# Patient Record
Sex: FEMALE | Race: WHITE | NOT HISPANIC OR LATINO | URBAN - METROPOLITAN AREA
[De-identification: names, ages, dates, MRNs, and addresses within clinical notes are randomized per-mention and may not be internally consistent; named-entity substitution may affect disease eponyms.]

---

## 2020-06-02 ENCOUNTER — TELEPHONE ENCOUNTER (OUTPATIENT)
Dept: URBAN - METROPOLITAN AREA CLINIC 92 | Facility: CLINIC | Age: 65
End: 2020-06-02

## 2021-02-19 ENCOUNTER — LAB OUTSIDE AN ENCOUNTER (OUTPATIENT)
Dept: URBAN - NONMETROPOLITAN AREA CLINIC 2 | Facility: CLINIC | Age: 66
End: 2021-02-19

## 2021-02-19 ENCOUNTER — OFFICE VISIT (OUTPATIENT)
Dept: URBAN - NONMETROPOLITAN AREA CLINIC 2 | Facility: CLINIC | Age: 66
End: 2021-02-19
Payer: MEDICARE

## 2021-02-19 DIAGNOSIS — Z80.0 FAMILY HISTORY OF COLON CANCER: ICD-10-CM

## 2021-02-19 DIAGNOSIS — R77.2 ELEVATED AFP: ICD-10-CM

## 2021-02-19 DIAGNOSIS — R11.0 NAUSEA: ICD-10-CM

## 2021-02-19 DIAGNOSIS — E78.00 ELEVATED CHOLESTEROL: ICD-10-CM

## 2021-02-19 DIAGNOSIS — K74.3 PRIMARY BILIARY CIRRHOSIS: ICD-10-CM

## 2021-02-19 DIAGNOSIS — K63.89 SMALL BOWEL POLYP: ICD-10-CM

## 2021-02-19 DIAGNOSIS — Z86.010 PERSONAL HISTORY OF COLONIC POLYPS: ICD-10-CM

## 2021-02-19 PROCEDURE — 3017F COLORECTAL CA SCREEN DOC REV: CPT | Performed by: INTERNAL MEDICINE

## 2021-02-19 PROCEDURE — G8417 CALC BMI ABV UP PARAM F/U: HCPCS | Performed by: INTERNAL MEDICINE

## 2021-02-19 PROCEDURE — 99214 OFFICE O/P EST MOD 30 MIN: CPT | Performed by: INTERNAL MEDICINE

## 2021-02-19 PROCEDURE — G8484 FLU IMMUNIZE NO ADMIN: HCPCS | Performed by: INTERNAL MEDICINE

## 2021-02-19 PROCEDURE — G9903 PT SCRN TBCO ID AS NON USER: HCPCS | Performed by: INTERNAL MEDICINE

## 2021-02-19 PROCEDURE — G8427 DOCREV CUR MEDS BY ELIG CLIN: HCPCS | Performed by: INTERNAL MEDICINE

## 2021-02-19 RX ORDER — URSODIOL 500 MG/1
TAKE 1 TABLET BY MOUTH TWICE DAILY TABLET ORAL
Qty: 180 | Refills: 3 | Status: ACTIVE | COMMUNITY
Start: 2020-05-20

## 2021-02-19 RX ORDER — PHENTERMINE HYDROCHLORIDE 30 MG/1
CAPSULE ORAL
Qty: 0 | Refills: 0 | Status: ACTIVE | COMMUNITY
Start: 1900-01-01

## 2021-02-19 NOTE — HPI-TODAY'S VISIT:
Patient returns for follow-up of primary biliary cirrhosis and an elevated alpha-fetoprotein.  She is on ursodiol daily.  Labs last year were normal other than an elevated alkaline phosphatase.  Review of labs that she brings with her dated 1/21/2021 showed normal LFTs other than an alkaline phosphatase of 203.  Her cholesterol remains elevated. She denies right upper quadrant pain jaundice or itching.  Her appetite is good and her weight is up slightly.  She has no signs or symptoms of decompensated liver disease. Her bowel movements are normal.  There is no evidence of bleeding.  There is been no change in her bowel habits.  She does have a history of adenomatous polyps.  Her next colonoscopy is due in 2023. She denies reflux symptoms.  She has no dysphagia.  She is on no new medications. She had Covid infection in September 2020.  She did not require hospitalization.

## 2021-02-20 LAB
AFP, SERUM, TUMOR MARKER: 17.7
HEMATOCRIT: 40.5
HEMOGLOBIN: 13.7
MCH: 31.3
MCHC: 33.8
MCV: 93
NRBC: (no result)
PLATELETS: 258
RBC: 4.38
RDW: 12.9
WBC: 4.2

## 2021-05-05 ENCOUNTER — ERX REFILL RESPONSE (OUTPATIENT)
Dept: URBAN - NONMETROPOLITAN AREA CLINIC 2 | Facility: CLINIC | Age: 66
End: 2021-05-05

## 2021-05-05 RX ORDER — URSODIOL 500 MG/1
TAKE 1 TABLET BY MOUTH TWICE DAILY TABLET ORAL
Qty: 60 | Refills: 0

## 2021-08-20 ENCOUNTER — OFFICE VISIT (OUTPATIENT)
Dept: URBAN - NONMETROPOLITAN AREA CLINIC 2 | Facility: CLINIC | Age: 66
End: 2021-08-20

## 2021-09-09 ENCOUNTER — LAB OUTSIDE AN ENCOUNTER (OUTPATIENT)
Dept: URBAN - NONMETROPOLITAN AREA CLINIC 13 | Facility: CLINIC | Age: 66
End: 2021-09-09

## 2021-09-09 ENCOUNTER — OFFICE VISIT (OUTPATIENT)
Dept: URBAN - NONMETROPOLITAN AREA CLINIC 13 | Facility: CLINIC | Age: 66
End: 2021-09-09
Payer: MEDICARE

## 2021-09-09 ENCOUNTER — WEB ENCOUNTER (OUTPATIENT)
Dept: URBAN - NONMETROPOLITAN AREA CLINIC 13 | Facility: CLINIC | Age: 66
End: 2021-09-09

## 2021-09-09 DIAGNOSIS — Z86.010 PERSONAL HISTORY OF COLONIC POLYPS: ICD-10-CM

## 2021-09-09 DIAGNOSIS — R77.2 ELEVATED AFP: ICD-10-CM

## 2021-09-09 DIAGNOSIS — Z80.0 FAMILY HISTORY OF COLON CANCER: ICD-10-CM

## 2021-09-09 DIAGNOSIS — E78.00 ELEVATED CHOLESTEROL: ICD-10-CM

## 2021-09-09 DIAGNOSIS — K74.3 PRIMARY BILIARY CIRRHOSIS: ICD-10-CM

## 2021-09-09 DIAGNOSIS — R11.0 NAUSEA: ICD-10-CM

## 2021-09-09 DIAGNOSIS — K63.89 SMALL BOWEL POLYP: ICD-10-CM

## 2021-09-09 PROCEDURE — 99214 OFFICE O/P EST MOD 30 MIN: CPT | Performed by: INTERNAL MEDICINE

## 2021-09-09 RX ORDER — URSODIOL 500 MG/1
TAKE 1 TABLET BY MOUTH TWICE DAILY TABLET ORAL
Qty: 60 | Refills: 0 | Status: ACTIVE | COMMUNITY

## 2021-09-09 RX ORDER — PHENTERMINE HYDROCHLORIDE 30 MG/1
CAPSULE ORAL
Qty: 0 | Refills: 0 | Status: ACTIVE | COMMUNITY
Start: 1900-01-01

## 2021-09-09 NOTE — HPI-TODAY'S VISIT:
Patient returns for follow-up of primary biliary cirrhosis, elevated alpha-fetoprotein and small bowel polyp. Patient is asymptomatic in terms of the primary biliary cirrhosis.  She has no itching or jaundice.  She denies signs or symptoms of decompensated liver disease.  Her appetite is good and her weight is stable.  She is currently on Félix.  Labs at the last visit were normal other than an elevated alkaline phosphatase. Patient does have an elevated alpha-fetoprotein that is remained stable.  Serial imaging has shown no mass. She was noted to have a small bowel polyp on previous colonoscopy.  She currently has no bowel symptoms.  Her bowel movements are normal.  There is no abdominal pain.  She has no evidence of bleeding.  She has not been anemic or iron deficient.  There is a family history of of colon cancer and a personal history of colon polyps. She has no upper GI symptoms.

## 2022-03-03 ENCOUNTER — OFFICE VISIT (OUTPATIENT)
Dept: URBAN - NONMETROPOLITAN AREA CLINIC 13 | Facility: CLINIC | Age: 67
End: 2022-03-03
Payer: MEDICARE

## 2022-03-03 ENCOUNTER — WEB ENCOUNTER (OUTPATIENT)
Dept: URBAN - NONMETROPOLITAN AREA CLINIC 13 | Facility: CLINIC | Age: 67
End: 2022-03-03

## 2022-03-03 DIAGNOSIS — R77.2 ELEVATED AFP: ICD-10-CM

## 2022-03-03 DIAGNOSIS — E78.00 ELEVATED CHOLESTEROL: ICD-10-CM

## 2022-03-03 DIAGNOSIS — Z86.010 PERSONAL HISTORY OF COLONIC POLYPS: ICD-10-CM

## 2022-03-03 DIAGNOSIS — Z80.0 FAMILY HISTORY OF COLON CANCER: ICD-10-CM

## 2022-03-03 DIAGNOSIS — K63.89 SMALL BOWEL POLYP: ICD-10-CM

## 2022-03-03 DIAGNOSIS — R11.0 NAUSEA: ICD-10-CM

## 2022-03-03 DIAGNOSIS — K74.3 PRIMARY BILIARY CIRRHOSIS: ICD-10-CM

## 2022-03-03 PROCEDURE — 99214 OFFICE O/P EST MOD 30 MIN: CPT | Performed by: INTERNAL MEDICINE

## 2022-03-03 RX ORDER — URSODIOL 500 MG/1
TAKE 1 TABLET BY MOUTH TWICE DAILY TABLET ORAL
Qty: 60 | Refills: 0 | Status: ACTIVE | COMMUNITY

## 2022-03-03 RX ORDER — PHENTERMINE HYDROCHLORIDE 30 MG/1
CAPSULE ORAL
Qty: 0 | Refills: 0 | Status: ACTIVE | COMMUNITY
Start: 1900-01-01

## 2022-03-03 NOTE — HPI-TODAY'S VISIT:
Patient returns for follow-up of PBC.  She is currently on ursodiol.  Elastography done at last office visit shows F2 disease.  LFTs show an alk phos of 180 but are otherwise unremarkable. Patient has a chronically elevated AFP.  MRI done in November 2021 shows a hemangioma but is otherwise unremarkable. Patient does have a past history of colon polyps.  There is a family history of colon cancer.  On her last colonoscopy there was a small polyp in the distal ileum.  This was not removed.  Biopsy was non-specific

## 2022-04-19 ENCOUNTER — LAB OUTSIDE AN ENCOUNTER (OUTPATIENT)
Dept: URBAN - METROPOLITAN AREA CLINIC 92 | Facility: CLINIC | Age: 67
End: 2022-04-19

## 2022-04-19 ENCOUNTER — TELEPHONE ENCOUNTER (OUTPATIENT)
Dept: URBAN - METROPOLITAN AREA CLINIC 92 | Facility: CLINIC | Age: 67
End: 2022-04-19

## 2022-04-19 RX ORDER — OBETICHOLIC ACID 5 MG/1
1 TABLET TABLET, FILM COATED ORAL ONCE A DAY
Qty: 90 TABLETS | Refills: 1 | OUTPATIENT
Start: 2022-04-19 | End: 2022-10-16

## 2022-04-19 RX ORDER — PHENTERMINE HYDROCHLORIDE 30 MG/1
CAPSULE ORAL
Qty: 0 | Refills: 0 | Status: ACTIVE | COMMUNITY
Start: 1900-01-01

## 2022-04-19 RX ORDER — URSODIOL 500 MG/1
TAKE 1 TABLET BY MOUTH TWICE DAILY TABLET ORAL
Qty: 60 | Refills: 0 | Status: ACTIVE | COMMUNITY

## 2022-05-02 ENCOUNTER — TELEPHONE ENCOUNTER (OUTPATIENT)
Dept: URBAN - NONMETROPOLITAN AREA CLINIC 2 | Facility: CLINIC | Age: 67
End: 2022-05-02

## 2022-05-09 ENCOUNTER — TELEPHONE ENCOUNTER (OUTPATIENT)
Dept: URBAN - NONMETROPOLITAN AREA CLINIC 2 | Facility: CLINIC | Age: 67
End: 2022-05-09

## 2022-05-09 RX ORDER — URSODIOL 500 MG/1
1 TABLET TABLET ORAL TWICE A DAY
Qty: 180 TABLET | Refills: 3

## 2022-06-14 ENCOUNTER — TELEPHONE ENCOUNTER (OUTPATIENT)
Dept: URBAN - METROPOLITAN AREA CLINIC 6 | Facility: CLINIC | Age: 67
End: 2022-06-14

## 2022-09-06 ENCOUNTER — OFFICE VISIT (OUTPATIENT)
Dept: URBAN - NONMETROPOLITAN AREA CLINIC 13 | Facility: CLINIC | Age: 67
End: 2022-09-06
Payer: MEDICARE

## 2022-09-06 VITALS
WEIGHT: 160 LBS | DIASTOLIC BLOOD PRESSURE: 71 MMHG | BODY MASS INDEX: 26.66 KG/M2 | HEART RATE: 73 BPM | HEIGHT: 65 IN | SYSTOLIC BLOOD PRESSURE: 112 MMHG

## 2022-09-06 DIAGNOSIS — R77.2 ELEVATED AFP: ICD-10-CM

## 2022-09-06 DIAGNOSIS — Z86.010 PERSONAL HISTORY OF COLONIC POLYPS: ICD-10-CM

## 2022-09-06 DIAGNOSIS — E78.00 ELEVATED CHOLESTEROL: ICD-10-CM

## 2022-09-06 DIAGNOSIS — K63.89 SMALL BOWEL POLYP: ICD-10-CM

## 2022-09-06 DIAGNOSIS — R11.0 NAUSEA: ICD-10-CM

## 2022-09-06 DIAGNOSIS — K74.3 PRIMARY BILIARY CIRRHOSIS: ICD-10-CM

## 2022-09-06 DIAGNOSIS — Z80.0 FAMILY HISTORY OF COLON CANCER: ICD-10-CM

## 2022-09-06 PROCEDURE — 99214 OFFICE O/P EST MOD 30 MIN: CPT | Performed by: NURSE PRACTITIONER

## 2022-09-06 RX ORDER — URSODIOL 500 MG/1
1 TABLET TABLET ORAL TWICE A DAY
Qty: 180 TABLET | Refills: 3

## 2022-09-06 RX ORDER — OBETICHOLIC ACID 5 MG/1
1 TABLET TABLET, FILM COATED ORAL ONCE A DAY
Qty: 90 TABLETS | Refills: 1 | Status: ACTIVE | COMMUNITY
Start: 2022-04-19 | End: 2022-10-16

## 2022-09-06 RX ORDER — OBETICHOLIC ACID 5 MG/1
1 TABLET TABLET, FILM COATED ORAL ONCE A DAY
OUTPATIENT
Start: 2022-04-19 | End: 2022-10-16

## 2022-09-06 RX ORDER — URSODIOL 500 MG/1
1 TABLET TABLET ORAL TWICE A DAY
Qty: 180 TABLET | Refills: 3 | Status: ACTIVE | COMMUNITY

## 2022-09-06 RX ORDER — PHENTERMINE HYDROCHLORIDE 30 MG/1
CAPSULE ORAL
Qty: 0 | Refills: 0 | Status: ACTIVE | COMMUNITY
Start: 1900-01-01

## 2022-09-06 NOTE — HPI-OTHER HISTORIES
3/3/2022- Dr Mota  Patient returns for follow-up of PBC.  She is currently on ursodiol.  Elastography done at last office visit shows F2 disease.  LFTs show an alk phos of 180 but are otherwise unremarkable. Patient has a chronically elevated AFP.  MRI done in November 2021 shows a hemangioma but is otherwise unremarkable. Patient does have a past history of colon polyps.  There is a family history of colon cancer.  On her last colonoscopy there was a small polyp in the distal ileum.  This was not removed.  Biopsy was non-specific

## 2022-09-06 NOTE — PHYSICAL EXAM HENT:
Head,  normocephalic,  atraumatic,  Face,  Face within normal limits,  Ears,  External ears within normal limits,  Nose/Nasopharynx,  External nose  normal appearance,  Lips,  Appearance normal Render In Bullet Format When Appropriate: No Consent: The patient's consent was obtained including but not limited to risks of crusting, scabbing, blistering, scarring, darker or lighter pigmentary change, recurrence, incomplete removal and infection. Duration Of Freeze Thaw-Cycle (Seconds): 5 Post-Care Instructions: I reviewed with the patient in detail post-care instructions. Patient is to wear sunprotection, and avoid picking at any of the treated lesions. Pt may apply Vaseline to crusted or scabbing areas. Total Number Of Aks Treated: 15 Detail Level: Simple Number Of Freeze-Thaw Cycles: 1 freeze-thaw cycle

## 2023-01-03 ENCOUNTER — OFFICE VISIT (OUTPATIENT)
Dept: URBAN - NONMETROPOLITAN AREA CLINIC 13 | Facility: CLINIC | Age: 68
End: 2023-01-03
Payer: MEDICARE

## 2023-01-03 VITALS
HEART RATE: 64 BPM | BODY MASS INDEX: 27.29 KG/M2 | HEIGHT: 65 IN | DIASTOLIC BLOOD PRESSURE: 83 MMHG | SYSTOLIC BLOOD PRESSURE: 137 MMHG | WEIGHT: 163.8 LBS | TEMPERATURE: 98.6 F

## 2023-01-03 DIAGNOSIS — K63.89 SMALL BOWEL POLYP: ICD-10-CM

## 2023-01-03 DIAGNOSIS — K74.3 PRIMARY BILIARY CIRRHOSIS: ICD-10-CM

## 2023-01-03 DIAGNOSIS — R11.0 NAUSEA: ICD-10-CM

## 2023-01-03 PROBLEM — 428283002: Status: ACTIVE | Noted: 2021-02-19

## 2023-01-03 PROBLEM — 166561008: Status: ACTIVE | Noted: 2021-02-20

## 2023-01-03 PROCEDURE — 99214 OFFICE O/P EST MOD 30 MIN: CPT | Performed by: NURSE PRACTITIONER

## 2023-01-03 RX ORDER — URSODIOL 500 MG/1
1 TABLET TABLET ORAL TWICE A DAY
Qty: 180 TABLET | Refills: 3

## 2023-01-03 RX ORDER — PHENTERMINE HYDROCHLORIDE 30 MG/1
CAPSULE ORAL
Qty: 0 | Refills: 0 | Status: ON HOLD | COMMUNITY
Start: 1900-01-01

## 2023-01-03 RX ORDER — URSODIOL 500 MG/1
1 TABLET TABLET ORAL TWICE A DAY
Qty: 180 TABLET | Refills: 3 | Status: ACTIVE | COMMUNITY

## 2023-01-03 NOTE — HPI-TODAY'S VISIT:
1/3/2022 Ms. Swapna Cm is here for f/u of PBC. She has been maintained on ursodiol. She had a Elastography with F2 disease.  Her alk phos of 180 has been stable.  Labs from PCP 7/2022 stable. She has had repeat labs and reportedly stable- not available at time of office visit. She denies any abdominal pain, itching, or jaundice.  She also has a chronically elevated AFP.  MRI done in November 2021 and June 2022 shows a hemangioma but is otherwise unremarkable. She has not had this repeated recently.  She had a hx of colon polyps and family history of colon cancer.  Due- 2023. CS

## 2023-01-03 NOTE — HPI-OTHER HISTORIES
3/3/2022- Dr Mota  Patient returns for follow-up of PBC.  She is currently on ursodiol.  Elastography done at last office visit shows F2 disease.  LFTs show an alk phos of 180 but are otherwise unremarkable. Patient has a chronically elevated AFP.  MRI done in November 2021 shows a hemangioma but is otherwise unremarkable. Patient does have a past history of colon polyps.  There is a family history of colon cancer.  On her last colonoscopy there was a small polyp in the distal ileum.  This was not removed.  Biopsy was non-specific  9/6/2022 Ms. Swapna Cm is a 66 year old female here for PBC. She is a previous patient of Dr Mota last seen in March. She has been maintained on ursodiol. She had a Elastography with F2 disease.  Her alk phos of 180 has been stable.  Labs from PCP 7/2022 stable. She was unable to get O[place] due to cost.  She also has a chronically elevated AFP.  MRI done in November 2021 and June 2022 shows a hemangioma but is otherwise unremarkable. She had a hx of colon polyps and family history of colon cancer.  Due- 2023. CS

## 2023-06-13 ENCOUNTER — TELEPHONE ENCOUNTER (OUTPATIENT)
Dept: URBAN - NONMETROPOLITAN AREA CLINIC 13 | Facility: CLINIC | Age: 68
End: 2023-06-13

## 2023-06-13 RX ORDER — URSODIOL 500 MG/1
1 TABLET TABLET ORAL TWICE A DAY
Qty: 180 TABLET | Refills: 3

## 2023-07-06 ENCOUNTER — LAB OUTSIDE AN ENCOUNTER (OUTPATIENT)
Dept: URBAN - NONMETROPOLITAN AREA CLINIC 13 | Facility: CLINIC | Age: 68
End: 2023-07-06

## 2023-07-06 ENCOUNTER — OFFICE VISIT (OUTPATIENT)
Dept: URBAN - NONMETROPOLITAN AREA CLINIC 13 | Facility: CLINIC | Age: 68
End: 2023-07-06
Payer: MEDICARE

## 2023-07-06 ENCOUNTER — LAB OUTSIDE AN ENCOUNTER (OUTPATIENT)
Dept: URBAN - NONMETROPOLITAN AREA CLINIC 2 | Facility: CLINIC | Age: 68
End: 2023-07-06

## 2023-07-06 VITALS
BODY MASS INDEX: 28.16 KG/M2 | WEIGHT: 169 LBS | SYSTOLIC BLOOD PRESSURE: 130 MMHG | HEART RATE: 59 BPM | DIASTOLIC BLOOD PRESSURE: 73 MMHG | HEIGHT: 65 IN

## 2023-07-06 DIAGNOSIS — K74.3 PRIMARY BILIARY CIRRHOSIS: ICD-10-CM

## 2023-07-06 DIAGNOSIS — Z80.0 FAMILY HISTORY OF COLON CANCER: ICD-10-CM

## 2023-07-06 LAB
A/G RATIO: 1.4
ALBUMIN: 4
ALKALINE PHOSPHATASE: 239
ALT (SGPT): 26
ANION GAP: 15
AST (SGOT): 24
BASOPHILS AUTOMATED ABSOLUTE COUNT: 0.1
BASOPHILS RELATIVE PERCENT: 1.9
BILIRUBIN TOTAL: 0.5
BLOOD UREA NITROGEN: 11
BUN / CREAT RATIO: 12
CALCIUM: 9.8
CHLORIDE: 106
CO2: 27
CREATININE, SERUM: 0.91
EGFR (CKD-EPI): >60
EOSINOPHILS AUTOMATED ABSOLUTE COUNT: 0.4
EOSINOPHILS RELATIVE PERCENT: 8.5
GLUCOSE: 85
HEMATOCRIT: 39.7
HEMOGLOBIN: 12.9
LYMPHOCYTES AUTOMATED ABSOLUTE COUNT: 1
LYMPHOCYTES RELATIVE PERCENT: 24
MEAN CORPUSCULAR HEMOGLOBIN CONC: 32.6
MEAN CORPUSCULAR HEMOGLOBIN: 30.1
MEAN CORPUSCULAR VOLUME: 92.3
MEAN PLATELET VOLUME: 8.5
MONOCYTES AUTOMATED ABSOLUTE COUNT: 0.3
MONOCYTES RELATIVE PERCENT: 8
NEUTROPHILS AUTOMATED ABSOLUTE: 2.5
NEUTROPHILS RELATIVE PERCENT: 57.6
PLATELET COUNT: 231
POTASSIUM: 4.9
PROTEIN TOTAL: 6.9
RED BLOOD CELL COUNT: 4.3
RED CELL DISTRIBUTION WIDTH: 14.3
SODIUM: 143
WHITE BLOOD CELL COUNT: 4.3

## 2023-07-06 PROCEDURE — 99214 OFFICE O/P EST MOD 30 MIN: CPT | Performed by: INTERNAL MEDICINE

## 2023-07-06 RX ORDER — PHENTERMINE HYDROCHLORIDE 30 MG/1
CAPSULE ORAL
Qty: 0 | Refills: 0 | Status: ON HOLD | COMMUNITY
Start: 1900-01-01

## 2023-07-06 RX ORDER — URSODIOL 500 MG/1
1 TABLET TABLET ORAL TWICE A DAY
Qty: 180 TABLET | Refills: 3 | Status: ACTIVE | COMMUNITY

## 2023-07-06 RX ORDER — URSODIOL 500 MG/1
1 TABLET TABLET ORAL TWICE A DAY
Qty: 180 TABLET | Refills: 3

## 2023-07-06 NOTE — HPI-TODAY'S VISIT:
1/3/2022 Ms. Swapna Grace is here for f/u of PBC. She has been maintained on ursodiol. She had a Elastography with F2 disease.  Her alk phos of 180 has been stable.  Labs from PCP 7/2022 stable. She has had repeat labs and reportedly stable- not available at time of office visit. She denies any abdominal pain, itching, or jaundice.  She also has a chronically elevated AFP.  MRI done in November 2021 and June 2022 shows a hemangioma but is otherwise unremarkable. She has not had this repeated recently.  She had a hx of colon polyps and family history of colon cancer.  Due- 2023.  7/6/2023 Ms. grace returns for follow-up of PBC.  She had previously been followed by Dr. Mota.  She had a FibroScan that showed F2 disease without cirrhosis.  She had been maintained on ursodiol.  She continues ursodiol.  Josiah via was approved but overtly expensive for her.  She has been doing well.  She has some mild reflux symptoms.  She is due for colonoscopy this year due to family history of colon cancer.  She denies any pruritus, jaundice, or abdominal pain otherwise

## 2023-07-10 LAB — AFP-TUMOR MARKER: 17.6

## 2023-09-05 ENCOUNTER — OUT OF OFFICE VISIT (OUTPATIENT)
Dept: URBAN - NONMETROPOLITAN AREA SURGERY CENTER 1 | Facility: SURGERY CENTER | Age: 68
End: 2023-09-05

## 2023-09-05 ENCOUNTER — OUT OF OFFICE VISIT (OUTPATIENT)
Dept: URBAN - NONMETROPOLITAN AREA SURGERY CENTER 1 | Facility: SURGERY CENTER | Age: 68
End: 2023-09-05
Payer: MEDICARE

## 2023-09-05 DIAGNOSIS — D12.0 ADENOMA OF CECUM: ICD-10-CM

## 2023-09-05 DIAGNOSIS — K63.5 BENIGN COLON POLYPS: ICD-10-CM

## 2023-09-05 DIAGNOSIS — Z12.11 COLON CANCER SCREENING (HIGH RISK): ICD-10-CM

## 2023-09-05 DIAGNOSIS — Z80.0 FAMILY HISTORY OF COLON CANCER: ICD-10-CM

## 2023-09-05 DIAGNOSIS — Z80.0 BROTHER AT YOUNG AGE FAMILY HISTORY OF COLON CANCER: ICD-10-CM

## 2023-09-05 DIAGNOSIS — D12.5 ADENOMA OF SIGMOID COLON: ICD-10-CM

## 2023-09-05 DIAGNOSIS — Z12.11 COLON CANCER SCREENING: ICD-10-CM

## 2023-09-05 PROCEDURE — 45385 COLONOSCOPY W/LESION REMOVAL: CPT | Performed by: INTERNAL MEDICINE

## 2023-09-05 PROCEDURE — 00811 ANES LWR INTST NDSC NOS: CPT | Performed by: NURSE ANESTHETIST, CERTIFIED REGISTERED

## 2023-09-05 PROCEDURE — G8907 PT DOC NO EVENTS ON DISCHARG: HCPCS | Performed by: INTERNAL MEDICINE

## 2024-01-04 ENCOUNTER — LAB OUTSIDE AN ENCOUNTER (OUTPATIENT)
Dept: URBAN - NONMETROPOLITAN AREA CLINIC 13 | Facility: CLINIC | Age: 69
End: 2024-01-04

## 2024-01-04 ENCOUNTER — OFFICE VISIT (OUTPATIENT)
Dept: URBAN - NONMETROPOLITAN AREA CLINIC 13 | Facility: CLINIC | Age: 69
End: 2024-01-04
Payer: MEDICARE

## 2024-01-04 ENCOUNTER — DASHBOARD ENCOUNTERS (OUTPATIENT)
Age: 69
End: 2024-01-04

## 2024-01-04 ENCOUNTER — LAB OUTSIDE AN ENCOUNTER (OUTPATIENT)
Dept: URBAN - NONMETROPOLITAN AREA CLINIC 2 | Facility: CLINIC | Age: 69
End: 2024-01-04

## 2024-01-04 VITALS
DIASTOLIC BLOOD PRESSURE: 81 MMHG | SYSTOLIC BLOOD PRESSURE: 124 MMHG | HEART RATE: 60 BPM | BODY MASS INDEX: 29.66 KG/M2 | WEIGHT: 178 LBS | HEIGHT: 65 IN

## 2024-01-04 DIAGNOSIS — K74.3 PRIMARY BILIARY CIRRHOSIS: ICD-10-CM

## 2024-01-04 DIAGNOSIS — Z80.0 FAMILY HISTORY OF COLON CANCER: ICD-10-CM

## 2024-01-04 LAB
A/G RATIO: 1.5
ALBUMIN: 4.2
ALKALINE PHOSPHATASE: 245
ALT (SGPT): 27
ANION GAP: 11
AST (SGOT): 28
BASOPHILS AUTOMATED ABSOLUTE COUNT: 0.1
BASOPHILS RELATIVE PERCENT: 2.3
BILIRUBIN TOTAL: 0.5
BLOOD UREA NITROGEN: 15
BUN / CREAT RATIO: 15
CALCIUM: 9.8
CHLORIDE: 105
CO2: 29
CREATININE, SERUM: 0.98
EGFR (CKD-EPI): >60
EOSINOPHILS AUTOMATED ABSOLUTE COUNT: 0.3
EOSINOPHILS RELATIVE PERCENT: 5.8
GLUCOSE: 70
HEMATOCRIT: 40.6
HEMOGLOBIN: 13.6
INR: 0.85
LYMPHOCYTES AUTOMATED ABSOLUTE COUNT: 1.2
LYMPHOCYTES RELATIVE PERCENT: 24.5
MEAN CORPUSCULAR HEMOGLOBIN CONC: 33.7
MEAN CORPUSCULAR HEMOGLOBIN: 30.8
MEAN CORPUSCULAR VOLUME: 91.6
MEAN PLATELET VOLUME: 8.2
MONOCYTES AUTOMATED ABSOLUTE COUNT: 0.4
MONOCYTES RELATIVE PERCENT: 8.2
NEUTROPHILS AUTOMATED ABSOLUTE: 3
NEUTROPHILS RELATIVE PERCENT: 59.2
PLATELET COUNT: 242
POTASSIUM: 4.2
PROTEIN TOTAL: 7
PROTHROMBIN TIME: 9.1
RED BLOOD CELL COUNT: 4.43
RED CELL DISTRIBUTION WIDTH: 14
SODIUM: 141
WHITE BLOOD CELL COUNT: 5

## 2024-01-04 PROCEDURE — 99214 OFFICE O/P EST MOD 30 MIN: CPT | Performed by: INTERNAL MEDICINE

## 2024-01-04 RX ORDER — URSODIOL 500 MG/1
1 TABLET TABLET ORAL TWICE A DAY
Qty: 180 TABLET | Refills: 3 | Status: ACTIVE | COMMUNITY

## 2024-01-04 RX ORDER — URSODIOL 500 MG/1
1 TABLET TABLET ORAL TWICE A DAY
Qty: 180 TABLET | Refills: 3

## 2024-01-04 RX ORDER — PHENTERMINE HYDROCHLORIDE 30 MG/1
CAPSULE ORAL
Qty: 0 | Refills: 0 | Status: ON HOLD | COMMUNITY
Start: 1900-01-01

## 2024-01-04 NOTE — HPI-TODAY'S VISIT:
1/3/2022 Ms. Swapna Grace is here for f/u of PBC. She has been maintained on ursodiol. She had a Elastography with F2 disease.  Her alk phos of 180 has been stable.  Labs from PCP 7/2022 stable. She has had repeat labs and reportedly stable- not available at time of office visit. She denies any abdominal pain, itching, or jaundice.  She also has a chronically elevated AFP.  MRI done in November 2021 and June 2022 shows a hemangioma but is otherwise unremarkable. She has not had this repeated recently.  She had a hx of colon polyps and family history of colon cancer.  Due- 2023.  7/6/2023 Ms. grace returns for follow-up of PBC.  She had previously been followed by Dr. Mota.  She had a FibroScan that showed F2 disease without cirrhosis.  She had been maintained on ursodiol.  She continues ursodiol.  Josiah via was approved but overtly expensive for her.  She has been doing well.  She has some mild reflux symptoms.  She is due for colonoscopy this year due to family history of colon cancer.  She denies any pruritus, jaundice, or abdominal pain otherwise 1/4/2024 Swapna returns for follow-up of PBC.  She has of had a FibroScan in the past that only Showed stage II fibrosis.  She also had colonoscopy earlier this year with colon polyps.  These were both adenomas.  She remains on ursodiol.  She denies any pruritus, jaundice, or other symptoms.

## 2024-01-08 LAB — AFP-TUMOR MARKER: 16.6

## 2025-01-09 ENCOUNTER — LAB OUTSIDE AN ENCOUNTER (OUTPATIENT)
Dept: URBAN - NONMETROPOLITAN AREA CLINIC 13 | Facility: CLINIC | Age: 70
End: 2025-01-09

## 2025-01-09 ENCOUNTER — OFFICE VISIT (OUTPATIENT)
Dept: URBAN - NONMETROPOLITAN AREA CLINIC 13 | Facility: CLINIC | Age: 70
End: 2025-01-09
Payer: MEDICARE

## 2025-01-09 ENCOUNTER — LAB OUTSIDE AN ENCOUNTER (OUTPATIENT)
Dept: URBAN - NONMETROPOLITAN AREA CLINIC 2 | Facility: CLINIC | Age: 70
End: 2025-01-09

## 2025-01-09 VITALS
SYSTOLIC BLOOD PRESSURE: 115 MMHG | HEIGHT: 65 IN | WEIGHT: 155.2 LBS | HEART RATE: 71 BPM | BODY MASS INDEX: 25.86 KG/M2 | DIASTOLIC BLOOD PRESSURE: 72 MMHG

## 2025-01-09 DIAGNOSIS — K74.3 PRIMARY BILIARY CIRRHOSIS: ICD-10-CM

## 2025-01-09 DIAGNOSIS — Z80.0 FAMILY HISTORY OF COLON CANCER: ICD-10-CM

## 2025-01-09 LAB
A/G RATIO: 1.8
ALBUMIN: 4.4
ALKALINE PHOSPHATASE: 274
ALT (SGPT): 26
ANION GAP: 13
AST (SGOT): 25
BASOPHILS AUTOMATED ABSOLUTE COUNT: 0.1
BASOPHILS RELATIVE PERCENT: 1.9
BILIRUBIN TOTAL: 0.5
BLOOD UREA NITROGEN: 14
BUN / CREAT RATIO: 16
CALCIUM: 9.9
CHLORIDE: 103
CO2: 29
CREATININE, SERUM: 0.9
EGFR (CKD-EPI): >60
EOSINOPHILS AUTOMATED ABSOLUTE COUNT: 0.3
EOSINOPHILS RELATIVE PERCENT: 7.3
GLUCOSE: 88
HEMATOCRIT: 42.5
HEMOGLOBIN: 14
LYMPHOCYTES AUTOMATED ABSOLUTE COUNT: 1
LYMPHOCYTES RELATIVE PERCENT: 20.7
MEAN CORPUSCULAR HEMOGLOBIN CONC: 32.9
MEAN CORPUSCULAR HEMOGLOBIN: 30.5
MEAN CORPUSCULAR VOLUME: 92.5
MEAN PLATELET VOLUME: 8.8
MONOCYTES AUTOMATED ABSOLUTE COUNT: 0.5
MONOCYTES RELATIVE PERCENT: 9.5
NEUTROPHILS AUTOMATED ABSOLUTE: 2.9
NEUTROPHILS RELATIVE PERCENT: 60.6
PLATELET COUNT: 201
POTASSIUM: 4.9
PROTEIN TOTAL: 6.9
RED BLOOD CELL COUNT: 4.59
RED CELL DISTRIBUTION WIDTH: 13.6
SODIUM: 140
WHITE BLOOD CELL COUNT: 4.8

## 2025-01-09 PROCEDURE — 99214 OFFICE O/P EST MOD 30 MIN: CPT | Performed by: INTERNAL MEDICINE

## 2025-01-09 RX ORDER — PHENTERMINE HYDROCHLORIDE 30 MG/1
CAPSULE ORAL
Qty: 0 | Refills: 0 | Status: ON HOLD | COMMUNITY
Start: 1900-01-01

## 2025-01-09 RX ORDER — URSODIOL 500 MG/1
1 TABLET TABLET ORAL TWICE A DAY
Qty: 180 TABLET | Refills: 3

## 2025-01-09 RX ORDER — URSODIOL 500 MG/1
1 TABLET TABLET ORAL TWICE A DAY
Qty: 180 TABLET | Refills: 3 | Status: ACTIVE | COMMUNITY

## 2025-01-09 NOTE — HPI-TODAY'S VISIT:
1/3/2022 Ms. Swapna Grace is here for f/u of PBC. She has been maintained on ursodiol. She had a Elastography with F2 disease.  Her alk phos of 180 has been stable.  Labs from PCP 7/2022 stable. She has had repeat labs and reportedly stable- not available at time of office visit. She denies any abdominal pain, itching, or jaundice.  She also has a chronically elevated AFP.  MRI done in November 2021 and June 2022 shows a hemangioma but is otherwise unremarkable. She has not had this repeated recently.  She had a hx of colon polyps and family history of colon cancer.  Due- 2023.  7/6/2023 Ms. grace returns for follow-up of PBC.  She had previously been followed by Dr. Mota.  She had a FibroScan that showed F2 disease without cirrhosis.  She had been maintained on ursodiol.  She continues ursodiol.  Josiah via was approved but overtly expensive for her.  She has been doing well.  She has some mild reflux symptoms.  She is due for colonoscopy this year due to family history of colon cancer.  She denies any pruritus, jaundice, or abdominal pain otherwise 1/4/2024 Swapna returns for follow-up of PBC.  She has of had a FibroScan in the past that only Showed stage II fibrosis.  She also had colonoscopy earlier this year with colon polyps.  These were both adenomas.  She remains on ursodiol.  She denies any pruritus, jaundice, or other symptoms. 1/9/2025 Ms. grace returns for follow-up for primary biliary cirrhosis.  She remains on ursodiol.  She was approved for [place] but due to the cost we decided to remain on ursodiol.  She does well.  She has no pruritus or abdominal pain.  She is going to be retiring soon.  She plans on traveling throughout the [place] in an RV.  She has no other complaints or concerns today.

## 2025-01-13 ENCOUNTER — TELEPHONE ENCOUNTER (OUTPATIENT)
Dept: URBAN - NONMETROPOLITAN AREA CLINIC 2 | Facility: CLINIC | Age: 70
End: 2025-01-13

## 2025-01-13 LAB — AFP-TUMOR MARKER: 15.4

## 2025-01-13 RX ORDER — SELADELPAR LYSINE 10 MG/1
1 CAPSULE CAPSULE ORAL ONCE A DAY
Qty: 30 | Refills: 11 | OUTPATIENT
Start: 2025-01-16

## 2025-04-29 NOTE — PHYSICAL EXAM CONSTITUTIONAL:
well developed, well nourished , in no acute distress , ambulating without difficulty, normal communication ability 1 or 2

## 2025-07-17 ENCOUNTER — LAB OUTSIDE AN ENCOUNTER (OUTPATIENT)
Dept: URBAN - NONMETROPOLITAN AREA CLINIC 13 | Facility: CLINIC | Age: 70
End: 2025-07-17

## 2025-07-17 ENCOUNTER — OFFICE VISIT (OUTPATIENT)
Dept: URBAN - NONMETROPOLITAN AREA CLINIC 13 | Facility: CLINIC | Age: 70
End: 2025-07-17
Payer: MEDICARE

## 2025-07-17 DIAGNOSIS — K74.3 PRIMARY BILIARY CIRRHOSIS: ICD-10-CM

## 2025-07-17 DIAGNOSIS — Z80.0 FAMILY HISTORY OF COLON CANCER: ICD-10-CM

## 2025-07-17 LAB
A/G RATIO: 1.4
ALBUMIN: 4.1
ALKALINE PHOSPHATASE: 233
ALT (SGPT): 22
ANION GAP: 11
AST (SGOT): 23
BASOPHILS AUTOMATED ABSOLUTE COUNT: 0.1
BASOPHILS RELATIVE PERCENT: 1.4
BILIRUBIN TOTAL: 0.4
BLOOD UREA NITROGEN: 12
BUN / CREAT RATIO: 14
CALCIUM: 10.1
CHLORIDE: 101
CO2: 32
CREATININE, SERUM: 0.83
EGFR (CKD-EPI): >60
EOSINOPHILS AUTOMATED ABSOLUTE COUNT: 0.3
EOSINOPHILS RELATIVE PERCENT: 6.5
GLUCOSE: 91
HEMATOCRIT: 40.6
HEMOGLOBIN: 13.6
LYMPHOCYTES AUTOMATED ABSOLUTE COUNT: 1
LYMPHOCYTES RELATIVE PERCENT: 20.4
MEAN CORPUSCULAR HEMOGLOBIN CONC: 33.6
MEAN CORPUSCULAR HEMOGLOBIN: 30.4
MEAN CORPUSCULAR VOLUME: 90.5
MEAN PLATELET VOLUME: 8.7
MONOCYTES AUTOMATED ABSOLUTE COUNT: 0.4
MONOCYTES RELATIVE PERCENT: 8.4
NEUTROPHILS AUTOMATED ABSOLUTE: 3.1
NEUTROPHILS RELATIVE PERCENT: 63.3
PLATELET COUNT: 237
POTASSIUM: 4.4
PROTEIN TOTAL: 7.1
RED BLOOD CELL COUNT: 4.48
RED CELL DISTRIBUTION WIDTH: 14.5
SODIUM: 140
WHITE BLOOD CELL COUNT: 4.8

## 2025-07-17 PROCEDURE — 99214 OFFICE O/P EST MOD 30 MIN: CPT | Performed by: INTERNAL MEDICINE

## 2025-07-17 RX ORDER — URSODIOL 500 MG/1
1 TABLET TABLET ORAL TWICE A DAY
Qty: 180 TABLET | Refills: 3 | Status: ACTIVE | COMMUNITY

## 2025-07-17 RX ORDER — SELADELPAR LYSINE 10 MG/1
1 CAPSULE CAPSULE ORAL ONCE A DAY
Qty: 30 | Refills: 11 | Status: ON HOLD | COMMUNITY
Start: 2025-01-16

## 2025-07-17 RX ORDER — URSODIOL 500 MG/1
1 TABLET TABLET ORAL TWICE A DAY
Qty: 180 TABLET | Refills: 3
Start: 2025-07-17

## 2025-07-17 RX ORDER — PHENTERMINE HYDROCHLORIDE 30 MG/1
CAPSULE ORAL
Qty: 0 | Refills: 0 | Status: ON HOLD | COMMUNITY
Start: 1900-01-01

## 2025-07-17 NOTE — HPI-TODAY'S VISIT:
1/3/2022 Ms. Swapna Grace is here for f/u of PBC. She has been maintained on ursodiol. She had a Elastography with F2 disease.  Her alk phos of 180 has been stable.  Labs from PCP 7/2022 stable. She has had repeat labs and reportedly stable- not available at time of office visit. She denies any abdominal pain, itching, or jaundice.  She also has a chronically elevated AFP.  MRI done in November 2021 and June 2022 shows a hemangioma but is otherwise unremarkable. She has not had this repeated recently.  She had a hx of colon polyps and family history of colon cancer.  Due- 2023.  7/6/2023 Ms. grace returns for follow-up of PBC.  She had previously been followed by Dr. Mota.  She had a FibroScan that showed F2 disease without cirrhosis.  She had been maintained on ursodiol.  She continues ursodiol.  Josiah via was approved but overtly expensive for her.  She has been doing well.  She has some mild reflux symptoms.  She is due for colonoscopy this year due to family history of colon cancer.  She denies any pruritus, jaundice, or abdominal pain otherwise 1/4/2024 Swapna returns for follow-up of PBC.  She has of had a FibroScan in the past that only Showed stage II fibrosis.  She also had colonoscopy earlier this year with colon polyps.  These were both adenomas.  She remains on ursodiol.  She denies any pruritus, jaundice, or other symptoms. 1/9/2025 Ms. grace returns for follow-up for primary biliary cirrhosis.  She remains on ursodiol.  She was approved for [place] but due to the cost we decided to remain on ursodiol.  She does well.  She has no pruritus or abdominal pain.  She is going to be retiring soon.  She plans on traveling throughout the [place] in an RV.  She has no other complaints or concerns today. 7/17/2025 Swapna returns for follow-up for PBC.  She has been on ursodiol for many years.  She does have persistently elevated alkaline phosphatase.  We attempted to get her on a Josiah via and lizvrldi but these were not affordable.  She seems to be doing well.  She is retired and is very active.  She does have some fatigue.  She denies any pruritus

## 2025-07-17 NOTE — PHYSICAL EXAM GASTROINTESTINAL
Abdomen , soft, nontender, nondistended , no guarding or rigidity , no masses palpable , normal bowel sounds , Liver and Spleen , no hepatomegaly present  , liver nontender ,

## 2025-07-17 NOTE — PHYSICAL EXAM PSYCH:
The right coronary artery was selectively engaged, injected and visualized. normal mood with appropriate affect

## 2025-07-17 NOTE — PHYSICAL EXAM SKIN:
no rashes , no suspicious lesions , no areas of discoloration , no jaundice present , good turgor , no masses ,

## 2025-07-24 LAB — AFP-TUMOR MARKER: 14.5

## 2025-07-30 ENCOUNTER — TELEPHONE ENCOUNTER (OUTPATIENT)
Dept: URBAN - NONMETROPOLITAN AREA CLINIC 13 | Facility: CLINIC | Age: 70
End: 2025-07-30

## 2025-07-30 RX ORDER — URSODIOL 500 MG/1
1 TABLET TABLET ORAL TWICE A DAY
Qty: 180 TABLET | Refills: 3
Start: 2025-07-17

## 2025-08-26 ENCOUNTER — CLAIMS CREATED FROM THE CLAIM WINDOW (OUTPATIENT)
Dept: URBAN - METROPOLITAN AREA CLINIC 117 | Facility: CLINIC | Age: 70
End: 2025-08-26
Payer: MEDICARE

## 2025-08-26 ENCOUNTER — OFFICE VISIT (OUTPATIENT)
Dept: URBAN - NONMETROPOLITAN AREA CLINIC 1 | Facility: CLINIC | Age: 70
End: 2025-08-26
Payer: MEDICARE

## 2025-08-26 DIAGNOSIS — K74.3 PRIMARY BILIARY CIRRHOSIS: ICD-10-CM

## 2025-08-26 DIAGNOSIS — K74.01 HEPATIC FIBROSIS, EARLY FIBROSIS: ICD-10-CM

## 2025-08-26 PROCEDURE — 76981 USE PARENCHYMA: CPT | Performed by: INTERNAL MEDICINE
